# Patient Record
Sex: FEMALE | Race: WHITE | NOT HISPANIC OR LATINO | URBAN - METROPOLITAN AREA
[De-identification: names, ages, dates, MRNs, and addresses within clinical notes are randomized per-mention and may not be internally consistent; named-entity substitution may affect disease eponyms.]

---

## 2017-01-03 NOTE — PATIENT DISCUSSION
Post-Op Instructions: Pred Forte (Prednisolone) 2 times per day for 5 days, then reduce to 1 time per day for 5 days, then discontinue.

## 2017-01-03 NOTE — PATIENT DISCUSSION
Continue: prednisolone acetate (prednisolone acetate): drops,suspension: 1% 1 drop four times a day into affected eye

## 2017-01-03 NOTE — PATIENT DISCUSSION
S/P PC IOL, OU. DOING WELL. CONTINUE DROPS AS DIRECTED. SPECS RX OFFERED. RX ARC IN SPECS TO MINIMIZE GLARE. RETURN FOR FOLLOW-UP AS SCHEDULED.

## 2018-03-22 ENCOUNTER — IMPORTED ENCOUNTER (OUTPATIENT)
Dept: URBAN - METROPOLITAN AREA CLINIC 9 | Facility: CLINIC | Age: 73
End: 2018-03-22

## 2019-04-24 ENCOUNTER — IMPORTED ENCOUNTER (OUTPATIENT)
Dept: URBAN - METROPOLITAN AREA CLINIC 9 | Facility: CLINIC | Age: 74
End: 2019-04-24

## 2020-06-02 ENCOUNTER — IMPORTED ENCOUNTER (OUTPATIENT)
Dept: URBAN - METROPOLITAN AREA CLINIC 9 | Facility: CLINIC | Age: 75
End: 2020-06-02

## 2020-06-02 PROBLEM — H43.813: Noted: 2020-06-02

## 2020-06-02 PROBLEM — H35.373: Noted: 2020-06-02

## 2020-06-02 PROBLEM — H04.123: Noted: 2020-06-02

## 2020-06-02 PROBLEM — Z96.1: Noted: 2020-06-02

## 2021-10-04 NOTE — PATIENT DISCUSSION
POSTERIOR CAPSULAR FIBROSIS, OU:  VISUALLY SIGNIFICANT. OPTION OF YAG LASER VERSUS UPDATING GLASSES VERSUS FOLLOWING DISCUSSED.  RBA'S DISCUSSED, PATIENT UNDERSTANDS AND DESIRES YAG LASER TO INCREASE VISION FOR READING AND WATCHING TV.  SCHEDULE YAG LASER OD then OS.

## 2021-10-18 ASSESSMENT — VISUAL ACUITY
OS_CC: 20/25 SN
OD_CC: 20/25 +2 SN
OD_CC: 20/25 SN
OD_CC: 20/40 SN
OD_CC: 20/40 SN
OS_CC: 20/20 SN
OD_CC: 20/30 SN
OD_CC: 20/25 SN
OS_CC: 20/20 - SN
OS_SC: 20/30 SN
OS_CC: 20/25 SN
OD_SC: 20/70 SN

## 2021-10-18 ASSESSMENT — KERATOMETRY
OS_K1POWER_DIOPTERS: 43.25
OD_AXISANGLE2_DEGREES: 162
OS_K2POWER_DIOPTERS: 44
OD_K1POWER_DIOPTERS: 42.75
OS_AXISANGLE_DEGREES: 170
OS_AXISANGLE2_DEGREES: 80
OS_K2POWER_DIOPTERS: 43.75
OD_K2POWER_DIOPTERS: 45
OD_AXISANGLE_DEGREES: 74
OS_AXISANGLE2_DEGREES: 76
OS_AXISANGLE_DEGREES: 166
OD_AXISANGLE2_DEGREES: 164
OS_K1POWER_DIOPTERS: 43
OD_K1POWER_DIOPTERS: 42.75
OD_K2POWER_DIOPTERS: 44.75
OD_AXISANGLE_DEGREES: 72

## 2021-10-18 ASSESSMENT — TONOMETRY
OD_IOP_MMHG: 15
OS_IOP_MMHG: 13
OS_IOP_MMHG: 11
OS_IOP_MMHG: 16
OD_IOP_MMHG: 15
OD_IOP_MMHG: 11

## 2022-06-22 RX ORDER — B-COMPLEX WITH VITAMIN C
TABLET ORAL
COMMUNITY

## 2022-06-22 RX ORDER — LOSARTAN POTASSIUM AND HYDROCHLOROTHIAZIDE 12.5; 5 MG/1; MG/1
TABLET ORAL
COMMUNITY

## 2022-06-23 ENCOUNTER — ESTABLISHED PATIENT (OUTPATIENT)
Dept: URBAN - METROPOLITAN AREA CLINIC 16 | Facility: CLINIC | Age: 77
End: 2022-06-23

## 2022-06-23 DIAGNOSIS — H43.813: ICD-10-CM

## 2022-06-23 DIAGNOSIS — H35.373: ICD-10-CM

## 2022-06-23 DIAGNOSIS — H04.123: ICD-10-CM

## 2022-06-23 DIAGNOSIS — Z96.1: ICD-10-CM

## 2022-06-23 PROCEDURE — 92134 CPTRZ OPH DX IMG PST SGM RTA: CPT

## 2022-06-23 PROCEDURE — 92015 DETERMINE REFRACTIVE STATE: CPT

## 2022-06-23 PROCEDURE — 92014 COMPRE OPH EXAM EST PT 1/>: CPT

## 2022-06-23 ASSESSMENT — VISUAL ACUITY
OD_CC: 20/30
OS_CC: 20/40
OU_CC: 20/25

## 2022-06-23 ASSESSMENT — KERATOMETRY
OD_K2POWER_DIOPTERS: 44.75
OS_AXISANGLE2_DEGREES: 76
OS_K2POWER_DIOPTERS: 44
OD_K1POWER_DIOPTERS: 42.75
OS_K1POWER_DIOPTERS: 43.25
OD_AXISANGLE_DEGREES: 72
OS_AXISANGLE_DEGREES: 166
OD_AXISANGLE2_DEGREES: 162

## 2022-06-23 ASSESSMENT — TONOMETRY
OD_IOP_MMHG: 13
OS_IOP_MMHG: 15

## 2022-08-15 ENCOUNTER — ESTABLISHED PATIENT (OUTPATIENT)
Dept: URBAN - METROPOLITAN AREA CLINIC 14 | Facility: CLINIC | Age: 77
End: 2022-08-15

## 2022-08-15 DIAGNOSIS — H43.21: ICD-10-CM

## 2022-08-15 DIAGNOSIS — H43.813: ICD-10-CM

## 2022-08-15 DIAGNOSIS — H35.373: ICD-10-CM

## 2022-08-15 PROCEDURE — 92134 CPTRZ OPH DX IMG PST SGM RTA: CPT

## 2022-08-15 PROCEDURE — 92014 COMPRE OPH EXAM EST PT 1/>: CPT

## 2022-08-15 ASSESSMENT — VISUAL ACUITY
OS_CC: 20/20-3
OD_CC: 20/20-1

## 2022-08-15 ASSESSMENT — TONOMETRY
OD_IOP_MMHG: 13
OS_IOP_MMHG: 13

## 2022-09-16 ENCOUNTER — PREPPED CHART (OUTPATIENT)
Dept: URBAN - METROPOLITAN AREA CLINIC 16 | Facility: CLINIC | Age: 77
End: 2022-09-16

## 2022-09-19 ENCOUNTER — ESTABLISHED PATIENT (OUTPATIENT)
Dept: URBAN - METROPOLITAN AREA CLINIC 16 | Facility: CLINIC | Age: 77
End: 2022-09-19

## 2022-09-19 DIAGNOSIS — H04.123: ICD-10-CM

## 2022-09-19 PROCEDURE — 92012 INTRM OPH EXAM EST PATIENT: CPT

## 2022-09-19 ASSESSMENT — TONOMETRY
OD_IOP_MMHG: 13
OS_IOP_MMHG: 10

## 2022-09-19 ASSESSMENT — VISUAL ACUITY
OD_CC: 20/20-1
OS_CC: 20/20

## 2023-11-02 ENCOUNTER — ESTABLISHED PATIENT (OUTPATIENT)
Dept: URBAN - METROPOLITAN AREA CLINIC 14 | Facility: CLINIC | Age: 78
End: 2023-11-02

## 2023-11-02 DIAGNOSIS — H04.123: ICD-10-CM

## 2023-11-02 DIAGNOSIS — H35.373: ICD-10-CM

## 2023-11-02 DIAGNOSIS — H43.21: ICD-10-CM

## 2023-11-02 DIAGNOSIS — H43.813: ICD-10-CM

## 2023-11-02 PROCEDURE — 92134 CPTRZ OPH DX IMG PST SGM RTA: CPT

## 2023-11-02 PROCEDURE — 99214 OFFICE O/P EST MOD 30 MIN: CPT

## 2023-11-02 ASSESSMENT — TONOMETRY
OS_IOP_MMHG: 11
OD_IOP_MMHG: 10

## 2023-11-02 ASSESSMENT — VISUAL ACUITY
OD_CC: 20/30-2
OS_CC: 20/25-1

## 2024-02-12 ENCOUNTER — ESTABLISHED PATIENT (OUTPATIENT)
Dept: URBAN - METROPOLITAN AREA CLINIC 16 | Facility: CLINIC | Age: 79
End: 2024-02-12

## 2024-02-12 DIAGNOSIS — Z96.1: ICD-10-CM

## 2024-02-12 DIAGNOSIS — H43.813: ICD-10-CM

## 2024-02-12 DIAGNOSIS — H43.21: ICD-10-CM

## 2024-02-12 DIAGNOSIS — H35.373: ICD-10-CM

## 2024-02-12 DIAGNOSIS — H04.123: ICD-10-CM

## 2024-02-12 PROCEDURE — 92014 COMPRE OPH EXAM EST PT 1/>: CPT

## 2024-02-12 PROCEDURE — 92015 DETERMINE REFRACTIVE STATE: CPT

## 2024-02-12 ASSESSMENT — KERATOMETRY
OD_K1POWER_DIOPTERS: 44.75
OS_K1POWER_DIOPTERS: 43.25
OD_AXISANGLE2_DEGREES: 77
OD_AXISANGLE_DEGREES: 167
OS_AXISANGLE2_DEGREES: 56
OS_K2POWER_DIOPTERS: 43.50
OD_K2POWER_DIOPTERS: 42.50
OS_AXISANGLE_DEGREES: 146

## 2024-02-12 ASSESSMENT — VISUAL ACUITY
OS_BCVA: 20/20-1
OD_BCVA: 20/20-1
OD_CC: 20/25
OS_CC: 20/20-1

## 2024-02-12 ASSESSMENT — TONOMETRY
OD_IOP_MMHG: 12
OS_IOP_MMHG: 13

## 2024-11-07 ENCOUNTER — COMPREHENSIVE EXAM (OUTPATIENT)
Dept: URBAN - METROPOLITAN AREA CLINIC 14 | Facility: CLINIC | Age: 79
End: 2024-11-07

## 2024-11-07 DIAGNOSIS — H43.813: ICD-10-CM

## 2024-11-07 DIAGNOSIS — H04.123: ICD-10-CM

## 2024-11-07 DIAGNOSIS — H43.21: ICD-10-CM

## 2024-11-07 DIAGNOSIS — H35.373: ICD-10-CM

## 2024-11-07 PROCEDURE — 99214 OFFICE O/P EST MOD 30 MIN: CPT

## 2024-11-07 PROCEDURE — 92134 CPTRZ OPH DX IMG PST SGM RTA: CPT

## 2025-07-09 ENCOUNTER — COMPREHENSIVE EXAM (OUTPATIENT)
Age: 80
End: 2025-07-09

## 2025-07-09 DIAGNOSIS — H43.813: ICD-10-CM

## 2025-07-09 DIAGNOSIS — H43.21: ICD-10-CM

## 2025-07-09 DIAGNOSIS — H04.123: ICD-10-CM

## 2025-07-09 DIAGNOSIS — H52.223: ICD-10-CM

## 2025-07-09 DIAGNOSIS — H35.373: ICD-10-CM

## 2025-07-09 PROCEDURE — 92015 DETERMINE REFRACTIVE STATE: CPT

## 2025-07-09 PROCEDURE — 92014 COMPRE OPH EXAM EST PT 1/>: CPT
